# Patient Record
Sex: MALE | Race: WHITE | NOT HISPANIC OR LATINO
[De-identification: names, ages, dates, MRNs, and addresses within clinical notes are randomized per-mention and may not be internally consistent; named-entity substitution may affect disease eponyms.]

---

## 2021-12-11 ENCOUNTER — TRANSCRIPTION ENCOUNTER (OUTPATIENT)
Age: 56
End: 2021-12-11

## 2021-12-11 ENCOUNTER — EMERGENCY (EMERGENCY)
Facility: HOSPITAL | Age: 56
LOS: 1 days | Discharge: HOME | End: 2021-12-11
Attending: EMERGENCY MEDICINE
Payer: COMMERCIAL

## 2021-12-11 VITALS
RESPIRATION RATE: 18 BRPM | SYSTOLIC BLOOD PRESSURE: 167 MMHG | HEART RATE: 102 BPM | DIASTOLIC BLOOD PRESSURE: 109 MMHG | WEIGHT: 244.93 LBS | TEMPERATURE: 103 F | OXYGEN SATURATION: 95 %

## 2021-12-11 LAB — SARS-COV-2 RNA SPEC QL NAA+PROBE: DETECTED

## 2021-12-11 PROCEDURE — U0005: CPT

## 2021-12-11 PROCEDURE — 99284 EMERGENCY DEPT VISIT MOD MDM: CPT

## 2021-12-11 PROCEDURE — U0003: CPT

## 2021-12-11 PROCEDURE — 99283 EMERGENCY DEPT VISIT LOW MDM: CPT

## 2021-12-11 RX ORDER — ACETAMINOPHEN 500 MG
975 TABLET ORAL ONCE
Refills: 0 | Status: COMPLETED | OUTPATIENT
Start: 2021-12-11 | End: 2021-12-11

## 2021-12-11 RX ADMIN — Medication 975 MILLIGRAM(S): at 14:07

## 2021-12-11 NOTE — ED STATDOCS - CLINICAL SUMMARY MEDICAL DECISION MAKING FREE TEXT BOX
Patient presenting with COVID+ symptoms from [facility] after being told to come to [facility] [address] for the antibody infusion treatment. Will treat fever and complete online referral for antibody infusion. To malathi

## 2021-12-11 NOTE — ED STATDOCS - PATIENT PORTAL LINK FT
You can access the FollowMyHealth Patient Portal offered by [facility] by registering at the following website: [email or web address]. By joining [facility]’s FollowMyHealth portal, you will also be able to view your health information using other applications (apps) compatible with our system.

## 2021-12-11 NOTE — ED STATDOCS - ATTENDING CONTRIBUTION TO CARE
AJM: pt presenting with covid requesting MAB. no distress or hypoxia. will reer for MAB, send covid test. dc with return precautions and pulseox

## 2021-12-11 NOTE — ED STATDOCS - NSFOLLOWUPINSTRUCTIONS_ED_ALL_ED_FT
- Follow up with your doctor within 2-3 days.   - Take Tylenol (Acetaminophen) 650mg or Motrin (Ibuprofen/Advil) 600mg every 6 hours as needed for pain.   - Stay well hydrated.   - Stay at home until you receive test results.   - See attached COVID-19 instructions.     You were tested for COVID19 during your visit in the emergency department. The results will take 5-7hrs to come back. Do not return to work until your COVID test results as negative. Plan to self-quarantine yourself until this result is available. Avoid contact with others. Wash your hands frequently with soap and warm water for at least 20 seconds. If you develop worsening symptoms- such as respiratory distress, confusion, severe weakness, or anything new or concerning, please return to the emergency department to be evaluated.

## 2021-12-11 NOTE — ED STATDOCS - NS ED ROS FT
General: + fever, chills  MSK: Denies back pain, joint pain  Resp: Endorses cough, sob  CV: Denies CP, palpitations  GI: Denies nausea, vomiting  Neuro: Denies numbness, tingling  Skin: Denies rashes, lacerations

## 2021-12-11 NOTE — ED STATDOCS - PHYSICAL EXAMINATION
Gen: Well appearing in NAD  Head: NC/AT  Neck: Trachea midline  Resp: No distress, speaking in full sentences, CTAB  Ext: No deformities  Neuro: A&O appears non focal  Skin: Warm and dry as visualized  Psych: Normal affect and mood

## 2021-12-11 NOTE — ED STATDOCS - OBJECTIVE STATEMENT
Patient is a 55yo M presenting COVID+ from home. He states that he is unvaxed, wife tested + for covid wednesday. Patient started feeling unwell yesterday. Went to [facility] and was told to go to [facility] for the antibody infusion treatment. He endorses fevers, malaise, sob, cough.

## 2021-12-12 PROBLEM — Z78.9 OTHER SPECIFIED HEALTH STATUS: Chronic | Status: ACTIVE | Noted: 2021-12-11

## 2021-12-14 ENCOUNTER — APPOINTMENT (OUTPATIENT)
Dept: DISASTER EMERGENCY | Facility: HOSPITAL | Age: 56
End: 2021-12-14

## 2021-12-14 ENCOUNTER — OUTPATIENT (OUTPATIENT)
Dept: INPATIENT UNIT | Facility: HOSPITAL | Age: 56
LOS: 1 days | End: 2021-12-14
Payer: COMMERCIAL

## 2021-12-14 VITALS
HEART RATE: 87 BPM | OXYGEN SATURATION: 95 % | RESPIRATION RATE: 18 BRPM | WEIGHT: 244.93 LBS | SYSTOLIC BLOOD PRESSURE: 146 MMHG | TEMPERATURE: 101 F | HEIGHT: 72 IN | DIASTOLIC BLOOD PRESSURE: 87 MMHG

## 2021-12-14 VITALS
SYSTOLIC BLOOD PRESSURE: 108 MMHG | HEART RATE: 81 BPM | OXYGEN SATURATION: 93 % | RESPIRATION RATE: 19 BRPM | DIASTOLIC BLOOD PRESSURE: 74 MMHG | TEMPERATURE: 100 F

## 2021-12-14 DIAGNOSIS — U07.1 COVID-19: ICD-10-CM

## 2021-12-14 PROCEDURE — M0243: CPT

## 2021-12-14 RX ORDER — SODIUM CHLORIDE 9 MG/ML
250 INJECTION INTRAMUSCULAR; INTRAVENOUS; SUBCUTANEOUS
Refills: 0 | Status: COMPLETED | OUTPATIENT
Start: 2021-12-14 | End: 2021-12-14

## 2021-12-14 RX ORDER — ACETAMINOPHEN 500 MG
650 TABLET ORAL ONCE
Refills: 0 | Status: COMPLETED | OUTPATIENT
Start: 2021-12-14 | End: 2021-12-14

## 2021-12-14 RX ADMIN — Medication 650 MILLIGRAM(S): at 14:45

## 2021-12-14 RX ADMIN — SODIUM CHLORIDE 310 MILLILITER(S): 9 INJECTION INTRAMUSCULAR; INTRAVENOUS; SUBCUTANEOUS at 14:45

## 2021-12-14 RX ADMIN — Medication 650 MILLIGRAM(S): at 15:30

## 2021-12-14 NOTE — CHART NOTE - NSCHARTNOTEFT_GEN_A_CORE
CC: Monoclonal Antibody Infusion/COVID 19 Positive on 12/08/21      History: Patient presents for infusion of monoclonal antibody infusion. Patient has been screened and was deemed to be a candidate.    Symptoms/ Criteria: Fever, malaise, body aches, HA, loss of taste/ smell, GI Symptoms    Risk Profile includes:, BMI >35, HTN, not vaccinated    acetaminophen     Tablet .. 650 milliGRAM(s) Oral once  casirivimab/imdevimab in sodium chloride 0.9% (EUA) IVPB 100 milliLiter(s) IV Intermittent once  sodium chloride 0.9%. 250 milliLiter(s) IV Continuous <Continuous>      PMHx:  Infection due to severe acute respiratory syndrome coronavirus 2 (SARS-CoV-2)    No pertinent past medical history    No significant past surgical history          T(F): 101.0  HR: 100  BP: 146/87  RR: 20  SpO2:92%      PE:   Appearance: NAD	  HEENT:   Normal oral mucosa.   Lymphatic: No lymphadenopathy  Cardiovascular: Normal S1 S2, No JVD, No murmurs, No edema  Respiratory: Lungs clear to auscultation	  Gastrointestinal:  Soft, Non-tender. No guarding   Skin: warm and dry  Neurologic: Non-focal  Extremities: Normal range of motion.     ASSESSMENT:  Pt is a 56y year old Male Covid +  referred to the infusion center for Monoclonal antibody infusion (Regeneron).  COVID Vaccination Status: Not vaccinated      PLAN:  - infusion procedure explained to patient   -tylenol given for temp 101.0F  -Incentive spirometry for home utiization  - Consent for monoclonal antibody infusion obtained   - Risk & benefits discussed/all questions answered  - infuse Regeneron over 21 minutes  - will observe patient for one hour post infusion  and then if stable discharge home with outpt follow up as planned by PMD.    POST INFUSION ASSESSMENT:   DISCHARGE at approximately  1  hour post infusion    - Patient tolerated infusion well denies complaints of chest pain/SOB/dizziness/ palps  - VSS for discharge home  - D/C instructions given/ fact sheet included.  - Patient to follow-up with PCP as needed.

## 2021-12-15 ENCOUNTER — TRANSCRIPTION ENCOUNTER (OUTPATIENT)
Age: 56
End: 2021-12-15

## 2021-12-16 ENCOUNTER — TRANSCRIPTION ENCOUNTER (OUTPATIENT)
Age: 56
End: 2021-12-16

## 2023-01-18 ENCOUNTER — APPOINTMENT (OUTPATIENT)
Dept: ORTHOPEDIC SURGERY | Facility: CLINIC | Age: 58
End: 2023-01-18
Payer: OTHER MISCELLANEOUS

## 2023-01-18 VITALS — BODY MASS INDEX: 28.44 KG/M2 | HEIGHT: 72 IN | WEIGHT: 210 LBS

## 2023-01-18 DIAGNOSIS — I10 ESSENTIAL (PRIMARY) HYPERTENSION: ICD-10-CM

## 2023-01-18 PROCEDURE — 72040 X-RAY EXAM NECK SPINE 2-3 VW: CPT

## 2023-01-18 PROCEDURE — 99203 OFFICE O/P NEW LOW 30 MIN: CPT

## 2023-01-18 PROCEDURE — 73030 X-RAY EXAM OF SHOULDER: CPT | Mod: RT

## 2023-01-18 PROCEDURE — 99072 ADDL SUPL MATRL&STAF TM PHE: CPT

## 2023-01-18 RX ORDER — IBUPROFEN 600 MG/1
600 TABLET, FILM COATED ORAL TWICE DAILY
Qty: 60 | Refills: 2 | Status: ACTIVE | COMMUNITY
Start: 2023-01-18 | End: 1900-01-01

## 2023-01-18 NOTE — ASSESSMENT
[FreeTextEntry1] : acute onset of right shoulder pain after opening up a safe door work on 1/11/23.  felt a pop in the shoulder at that time.  posterior lateral pain.    concern for cuff tear.\par \par  at [facility]\par \par htn, migraines

## 2023-01-18 NOTE — DISCUSSION/SUMMARY
[de-identified] : Progress Note completed by Arianna Rodas PA-C\par * Dr. Stern -- The documentation recorded in this note accurately reflects the decisions made by me during this visit.

## 2023-01-18 NOTE — WORK
[Sprain/Strain] : sprain/strain [Was the competent medical cause of the injury] : was the competent medical cause of the injury [Are consistent with the injury] : are consistent with the injury [Consistent with my objective findings] : consistent with my objective findings [Mild Partial] : mild partial [Does not reveal pre-existing condition(s) that may affect treatment/prognosis] : does not reveal pre-existing condition(s) that may affect treatment/prognosis [Can return to work without limitations on ______] : can return to work without limitations on [unfilled] [Patient] : patient [No Rx restrictions] : No Rx restrictions. [I provided the services listed above] :  I provided the services listed above.

## 2023-01-18 NOTE — HISTORY OF PRESENT ILLNESS
[Work related] : work related [Sudden] : sudden [9] : 9 [1] : 2 [Dull/Aching] : dull/aching [Sharp] : sharp [Rest] : rest [Meds] : meds [Full time] : Work status: full time [de-identified] : WC DOI 1/11/23\par 1/18/23:  acute onset of right shoulder pain after opening up a safe door at work on 1/11/23.  felt a pop in the shoulder at that time.  [] : no [FreeTextEntry1] : Rt shoulder [FreeTextEntry3] : 1/11/23 [FreeTextEntry5] : Pt reports opening up a safe with a heavy door and in the process he felt a "pop" in the right shoulder. Since the injury  he reports pain to the shoulder and numbness that is radiating down the Right arm into the hand. He denies any prior shoulder hx or injuries.  [FreeTextEntry6] : numbness [FreeTextEntry7] : RT arm [FreeTextEntry9] : NSAIDS prn [de-identified] : lifting, certain movements, sleep [de-identified] : [facility]

## 2023-01-18 NOTE — PHYSICAL EXAM
[4 ___] : forward flexion 4[unfilled]/5 [4___] : abduction 4[unfilled]/5 [5___] : internal rotation 5[unfilled]/5 [Straightening consistent with spasm] : Straightening consistent with spasm [] : no erythema [Right] : right shoulder [There are no fractures, subluxations or dislocations. No significant abnormalities are seen] : There are no fractures, subluxations or dislocations. No significant abnormalities are seen [TWNoteComboBox7] : active forward flexion 150 degrees [de-identified] : active abduction 130 degrees [TWNoteComboBox6] : internal rotation sacrum [de-identified] : external rotation 70 degrees

## 2023-01-19 ENCOUNTER — FORM ENCOUNTER (OUTPATIENT)
Age: 58
End: 2023-01-19

## 2023-01-20 ENCOUNTER — APPOINTMENT (OUTPATIENT)
Dept: MRI IMAGING | Facility: CLINIC | Age: 58
End: 2023-01-20
Payer: OTHER MISCELLANEOUS

## 2023-01-20 PROCEDURE — 73221 MRI JOINT UPR EXTREM W/O DYE: CPT | Mod: RT

## 2023-01-20 PROCEDURE — 99072 ADDL SUPL MATRL&STAF TM PHE: CPT

## 2023-01-23 ENCOUNTER — TRANSCRIPTION ENCOUNTER (OUTPATIENT)
Age: 58
End: 2023-01-23

## 2023-02-01 ENCOUNTER — NON-APPOINTMENT (OUTPATIENT)
Age: 58
End: 2023-02-01

## 2023-02-01 ENCOUNTER — APPOINTMENT (OUTPATIENT)
Dept: ORTHOPEDIC SURGERY | Facility: CLINIC | Age: 58
End: 2023-02-01
Payer: OTHER MISCELLANEOUS

## 2023-02-01 VITALS — BODY MASS INDEX: 28.44 KG/M2 | WEIGHT: 210 LBS | HEIGHT: 72 IN

## 2023-02-01 PROCEDURE — 99214 OFFICE O/P EST MOD 30 MIN: CPT | Mod: 25

## 2023-02-01 PROCEDURE — J3490M: CUSTOM | Mod: RT

## 2023-02-01 PROCEDURE — 20611 DRAIN/INJ JOINT/BURSA W/US: CPT | Mod: RT

## 2023-02-01 PROCEDURE — 99072 ADDL SUPL MATRL&STAF TM PHE: CPT

## 2023-02-01 NOTE — DISCUSSION/SUMMARY
[de-identified] : Progress Note completed by Arianna Rodas PA-C\par * Dr. Stern -- The documentation recorded in this note accurately reflects the decisions made by me during this visit.

## 2023-02-01 NOTE — WORK
[Sprain/Strain] : sprain/strain [Was the competent medical cause of the injury] : was the competent medical cause of the injury [Are consistent with the injury] : are consistent with the injury [Consistent with my objective findings] : consistent with my objective findings [Does not reveal pre-existing condition(s) that may affect treatment/prognosis] : does not reveal pre-existing condition(s) that may affect treatment/prognosis [Patient] : patient [No Rx restrictions] : No Rx restrictions. [I provided the services listed above] :  I provided the services listed above. 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

## 2023-02-01 NOTE — PHYSICAL EXAM
[Straightening consistent with spasm] : Straightening consistent with spasm [4 ___] : forward flexion 4[unfilled]/5 [4___] : abduction 4[unfilled]/5 [5___] : internal rotation 5[unfilled]/5 [Right] : right shoulder [There are no fractures, subluxations or dislocations. No significant abnormalities are seen] : There are no fractures, subluxations or dislocations. No significant abnormalities are seen [] : no erythema [TWNoteComboBox7] : active forward flexion 155 degrees [de-identified] : active abduction 130 degrees [TWNoteComboBox6] : internal rotation sacrum [de-identified] : external rotation 50 degrees

## 2023-02-01 NOTE — HISTORY OF PRESENT ILLNESS
[Work related] : work related [Gradual] : gradual [Dull/Aching] : dull/aching [Constant] : constant [Sudden] : sudden [9] : 9 [1] : 2 [Sharp] : sharp [Rest] : rest [Meds] : meds [Full time] : Work status: full time [de-identified] : WC DOI 1/11/23\par 1/18/23:  acute onset of right shoulder pain after opening up a safe door at work on 1/11/23.  felt a pop in the shoulder at that time.\par 2/1/23:  right shoulder pain persists.  [] : no [FreeTextEntry1] : Rt shoulder [FreeTextEntry3] : 1/11/23 [FreeTextEntry5] : Pt reports opening up a safe with a heavy door and in the process he felt a "pop" in the right shoulder. Since the injury  he reports pain to the shoulder and numbness that is radiating down the Right arm into the hand. He denies any prior shoulder hx or injuries.  [FreeTextEntry6] : numbness [FreeTextEntry7] : RT arm [FreeTextEntry9] : NSAIDS prn [de-identified] : lifting, certain movements, sleep [de-identified] : [facility]

## 2023-02-01 NOTE — PROCEDURE
[Large Joint Injection] : Large joint injection [Right] : of the right [Shoulder] : shoulder [Pain] : pain [Alcohol] : alcohol [Betadine] : betadine [___ cc    3mg] :  Betamethasone (Celestone) ~Vcc of 3mg [___ cc    1%] : Lidocaine ~Vcc of 1%  [___ cc    0.25%] : Bupivacaine (Marcaine) ~Vcc of 0.25%  [] : Patient tolerated procedure well [Call if redness, pain or fever occur] : call if redness, pain or fever occur [Apply ice for 15min out of every hour for the next 12-24 hours as tolerated] : apply ice for 15 minutes out of every hour for the next 12-24 hours as tolerated [Patient was advised to rest the joint(s) for ____ days] : patient was advised to rest the joint(s) for [unfilled] days [Previous OTC use and PT nontherapeutic] : patient has tried OTC's including aspirin, Ibuprofen, Aleve, etc or prescription NSAIDS, and/or exercises at home and/or physical therapy without satisfactory response [Patient had decreased mobility in the joint] : patient had decreased mobility in the joint [Risks, benefits, alternatives discussed / Verbal consent obtained] : the risks benefits, and alternatives have been discussed, and verbal consent was obtained [All ultrasound images have been permanently captured and stored accordingly in our picture archiving and communication system] : All ultrasound images have been permanently captured and stored accordingly in our picture archiving and communication system [Visualization of the needle and placement of injection was performed without complication] : visualization of the needle and placement of injection was performed without complication [Inflammation] : inflammation [de-identified] : for accurate placement of needle into sa space and joint

## 2023-02-01 NOTE — ASSESSMENT
[FreeTextEntry1] : acute onset of right shoulder pain after opening up a safe door work on 1/11/23.  felt a pop in the shoulder at that time. mri 2023 shows diffuse labral tearing, ac joint oa,  impingement, acromial spur.\par \par  at [facility]\par \par htn, migraines

## 2023-03-01 ENCOUNTER — APPOINTMENT (OUTPATIENT)
Dept: ORTHOPEDIC SURGERY | Facility: CLINIC | Age: 58
End: 2023-03-01
Payer: OTHER MISCELLANEOUS

## 2023-03-01 VITALS — HEIGHT: 72 IN | BODY MASS INDEX: 28.44 KG/M2 | WEIGHT: 210 LBS

## 2023-03-01 PROCEDURE — 99072 ADDL SUPL MATRL&STAF TM PHE: CPT

## 2023-03-01 PROCEDURE — 99214 OFFICE O/P EST MOD 30 MIN: CPT

## 2023-03-01 NOTE — WORK
[Sprain/Strain] : sprain/strain [Was the competent medical cause of the injury] : was the competent medical cause of the injury [Are consistent with the injury] : are consistent with the injury [Consistent with my objective findings] : consistent with my objective findings [Total] : total [Does not reveal pre-existing condition(s) that may affect treatment/prognosis] : does not reveal pre-existing condition(s) that may affect treatment/prognosis [Cannot return to work because ________] : cannot return to work because [unfilled] [Climbing stairs/Ladders] : climbing stairs/ladders [Lifting] : lifting [Pulling/Pushing] : pulling/pushing [Simple grasping] : simple grasping [Reaching overhead] : reaching overhead [Reaching at/below shoulder level] : reaching at or below shoulder level [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 [I provided the services listed above] :  I provided the services listed above.

## 2023-03-01 NOTE — HISTORY OF PRESENT ILLNESS
[Work related] : work related [Gradual] : gradual [Sudden] : sudden [Dull/Aching] : dull/aching [Sharp] : sharp [Constant] : constant [Rest] : rest [Meds] : meds [Full time] : Work status: full time [8] : 8 [4] : 4 [de-identified] : WC DOI 1/11/23\par 1/18/23:  acute onset of right shoulder pain after opening up a safe door at work on 1/11/23.  felt a pop in the shoulder at that time.\par 2/1/23:  right shoulder pain persists. \par 3/1/23: shoulder pain persists.  neck tightness also present and some tingling down the arm. [] : no [FreeTextEntry1] : Rt shoulder [FreeTextEntry3] : 1/11/23 [FreeTextEntry5] : Pt reports opening up a safe with a heavy door and in the process he felt a "pop" in the right shoulder. Since the injury  he reports pain to the shoulder and numbness that is radiating down the Right arm into the hand. He denies any prior shoulder hx or injuries.  [FreeTextEntry6] : numbness [FreeTextEntry7] : RT arm [FreeTextEntry9] : NSAIDS prn [de-identified] : lifting, certain movements, sleep [de-identified] : [facility]

## 2023-03-01 NOTE — ASSESSMENT
[FreeTextEntry1] : acute onset of right shoulder pain after opening up a safe door work on 1/11/23.  felt a pop in the shoulder at that time. mri 2023 shows diffuse labral tearing, ac joint oa,  impingement, acromial spur.  minimal improvement with injection and Pt.  still pain in shoulder.\par \par also tingling in hand.  some neck issues in past but never tinling in hand.  possible hnp.  see spine docs in [place] for a while ([facility])\par \par  at [facility]\par \par htn, migraines

## 2023-03-05 ENCOUNTER — FORM ENCOUNTER (OUTPATIENT)
Age: 58
End: 2023-03-05

## 2023-03-29 ENCOUNTER — APPOINTMENT (OUTPATIENT)
Dept: ORTHOPEDIC SURGERY | Facility: CLINIC | Age: 58
End: 2023-03-29
Payer: OTHER MISCELLANEOUS

## 2023-03-29 VITALS — HEIGHT: 72 IN | WEIGHT: 210 LBS | BODY MASS INDEX: 28.44 KG/M2

## 2023-03-29 PROCEDURE — 99214 OFFICE O/P EST MOD 30 MIN: CPT

## 2023-03-29 NOTE — WORK
[Sprain/Strain] : sprain/strain [Was the competent medical cause of the injury] : was the competent medical cause of the injury [Are consistent with the injury] : are consistent with the injury [Consistent with my objective findings] : consistent with my objective findings [Does not reveal pre-existing condition(s) that may affect treatment/prognosis] : does not reveal pre-existing condition(s) that may affect treatment/prognosis [Cannot return to work because ________] : cannot return to work because [unfilled] [Climbing stairs/Ladders] : climbing stairs/ladders [Lifting] : lifting [Pulling/Pushing] : pulling/pushing [Simple grasping] : simple grasping [Reaching overhead] : reaching overhead [Reaching at/below shoulder level] : reaching at or below shoulder level [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 [I provided the services listed above] :  I provided the services listed above. [Total (100%)] : total (100%)

## 2023-03-29 NOTE — ASSESSMENT
[FreeTextEntry1] : acute onset of right shoulder pain after opening up a safe door work on 1/11/23.  felt a pop in the shoulder at that time. mri 2023 shows diffuse labral tearing, ac joint oa,  impingement, acromial spur.  minimal improvement with injection and Pt.  patient states surgery being denied.  i reviewed the history again with the patient. the patient has no history of shoulder problems and the mechanism described is the cause of the injury to labrum and other structures demonstrated on the mri of the right shoulder. the ac joint arthritis present was likely exacerbated by the injury but was not symptomatic prior to injury.\par \par also tingling in hand.  some neck issues in past but never tinling in hand.  possible hnp.  see spine docs in [place] for a while ([facility]).  mild improvement.  continuing treatment.\par \par  at [facility]\par \par htn, migraines

## 2023-03-29 NOTE — PHYSICAL EXAM
[Right] : right shoulder [4 ___] : forward flexion 4[unfilled]/5 [4___] : abduction 4[unfilled]/5 [5___] : internal rotation 5[unfilled]/5 [] : no scars [de-identified] : some tingling in fingertips. [FreeTextEntry3] : minimal swelling [TWNoteComboBox7] : active forward flexion 160 degrees [de-identified] : active abduction 135 degrees [TWNoteComboBox6] : internal rotation sacrum [de-identified] : external rotation 50 degrees

## 2023-04-24 ENCOUNTER — NON-APPOINTMENT (OUTPATIENT)
Age: 58
End: 2023-04-24

## 2023-04-27 ENCOUNTER — FORM ENCOUNTER (OUTPATIENT)
Age: 58
End: 2023-04-27

## 2023-06-07 ENCOUNTER — APPOINTMENT (OUTPATIENT)
Dept: ORTHOPEDIC SURGERY | Facility: CLINIC | Age: 58
End: 2023-06-07
Payer: OTHER MISCELLANEOUS

## 2023-06-07 VITALS — WEIGHT: 210 LBS | BODY MASS INDEX: 28.44 KG/M2 | HEIGHT: 72 IN

## 2023-06-07 DIAGNOSIS — M54.2 CERVICALGIA: ICD-10-CM

## 2023-06-07 PROCEDURE — 99214 OFFICE O/P EST MOD 30 MIN: CPT

## 2023-06-07 NOTE — PHYSICAL EXAM
[Right] : right shoulder [4 ___] : forward flexion 4[unfilled]/5 [4___] : abduction 4[unfilled]/5 [5___] : internal rotation 5[unfilled]/5 [de-identified] : some tingling in fingertips. [] : swelling [FreeTextEntry3] : minimal swelling [TWNoteComboBox7] : active forward flexion 140 degrees [de-identified] : active abduction 140 degrees [TWNoteComboBox6] : internal rotation sacrum [de-identified] : external rotation 35 degrees

## 2023-06-07 NOTE — WORK
[Sprain/Strain] : sprain/strain [Was the competent medical cause of the injury] : was the competent medical cause of the injury [Are consistent with the injury] : are consistent with the injury [Consistent with my objective findings] : consistent with my objective findings [Total (100%)] : total (100%) [Does not reveal pre-existing condition(s) that may affect treatment/prognosis] : does not reveal pre-existing condition(s) that may affect treatment/prognosis [Cannot return to work because ________] : cannot return to work because [unfilled] [Climbing stairs/Ladders] : climbing stairs/ladders [Lifting] : lifting [Pulling/Pushing] : pulling/pushing [Simple grasping] : simple grasping [Reaching overhead] : reaching overhead [Reaching at/below shoulder level] : reaching at or below shoulder level [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 [I provided the services listed above] :  I provided the services listed above.

## 2023-06-07 NOTE — ASSESSMENT
[FreeTextEntry1] : acute onset of right shoulder pain after opening up a safe door work on 1/11/23.  felt a pop in the shoulder at that time. mri 2023 shows diffuse labral tearing, ac joint oa,  impingement, acromial spur.  minimal improvement with injection and Pt.  patient states surgery being denied but has seen juanita may 2023.  i reviewed the history again with the patient. the patient has no history of shoulder problems and the mechanism described is the cause of the injury to labrum and other structures demonstrated on the mri of the right shoulder. the ac joint arthritis present was likely exacerbated by the injury but was not symptomatic prior to injury.\par \par also tingling in hand.  some neck issues in past but never tinling in hand.  possible hnp.  see spine docs in [place] for a while ([facility]).  mild improvement.  continuing treatment.\par \par  at [facility]\par \par htn, migraines

## 2023-06-07 NOTE — HISTORY OF PRESENT ILLNESS
[8] : 8 [3] : 3 [] : yes [de-identified] : WC DOI 1/11/23\par 1/18/23:  acute onset of right shoulder pain after opening up a safe door at work on 1/11/23.  felt a pop in the shoulder at that time.\par 2/1/23:  right shoulder pain persists. \par 3/1/23: shoulder pain persists.  neck tightness also present and some tingling down the arm.\par 3/29/23:  right shoulder pain persists.  surgery has been denied at this point.  tingling with mild improvement.\par 6/7/23:  pain persists. waiting for authorization. [FreeTextEntry1] : right shoulder

## 2023-07-03 ENCOUNTER — APPOINTMENT (OUTPATIENT)
Dept: ORTHOPEDIC SURGERY | Facility: CLINIC | Age: 58
End: 2023-07-03

## 2023-07-05 ENCOUNTER — APPOINTMENT (OUTPATIENT)
Dept: ORTHOPEDIC SURGERY | Facility: CLINIC | Age: 58
End: 2023-07-05
Payer: OTHER MISCELLANEOUS

## 2023-07-05 PROCEDURE — L3670: CPT | Mod: RT

## 2023-07-10 ENCOUNTER — APPOINTMENT (OUTPATIENT)
Age: 58
End: 2023-07-10
Payer: OTHER MISCELLANEOUS

## 2023-07-10 PROCEDURE — 29823 SHO ARTHRS SRG XTNSV DBRDMT: CPT | Mod: RT

## 2023-07-10 PROCEDURE — 29826 SHO ARTHRS SRG DECOMPRESSION: CPT | Mod: AS,RT

## 2023-07-10 PROCEDURE — 29824 SHO ARTHRS SRG DSTL CLAVICLC: CPT | Mod: AS,59,RT

## 2023-07-10 PROCEDURE — 29821 SHO ARTHRS SRG COMPL SYNVCT: CPT | Mod: 59,RT

## 2023-07-10 PROCEDURE — 29825 SHO ARTHRS SRG LSS&RESCJ ADS: CPT | Mod: AS,59,RT

## 2023-07-10 PROCEDURE — 23405 INCISION OF TENDON & MUSCLE: CPT | Mod: AS,59,RT

## 2023-07-10 PROCEDURE — 29825 SHO ARTHRS SRG LSS&RESCJ ADS: CPT | Mod: 59,RT

## 2023-07-10 PROCEDURE — 29824 SHO ARTHRS SRG DSTL CLAVICLC: CPT | Mod: 59,RT

## 2023-07-10 PROCEDURE — 29821 SHO ARTHRS SRG COMPL SYNVCT: CPT | Mod: AS,59,RT

## 2023-07-10 PROCEDURE — 23405 INCISION OF TENDON & MUSCLE: CPT | Mod: 59,RT

## 2023-07-10 PROCEDURE — 29826 SHO ARTHRS SRG DECOMPRESSION: CPT | Mod: RT

## 2023-07-10 PROCEDURE — 29823 SHO ARTHRS SRG XTNSV DBRDMT: CPT | Mod: AS,RT

## 2023-07-10 RX ORDER — ASPIRIN/ACETAMINOPHEN/CAFFEINE 500-325-65
325 POWDER IN PACKET (EA) ORAL
Qty: 30 | Refills: 0 | Status: ACTIVE | COMMUNITY
Start: 2023-07-10 | End: 1900-01-01

## 2023-07-10 RX ORDER — HYDROCODONE BITARTRATE AND ACETAMINOPHEN 5; 325 MG/1; MG/1
5-325 TABLET ORAL
Qty: 42 | Refills: 0 | Status: ACTIVE | COMMUNITY
Start: 2023-07-10 | End: 1900-01-01

## 2023-07-19 ENCOUNTER — APPOINTMENT (OUTPATIENT)
Dept: ORTHOPEDIC SURGERY | Facility: CLINIC | Age: 58
End: 2023-07-19
Payer: OTHER MISCELLANEOUS

## 2023-07-19 DIAGNOSIS — S46.911A STRAIN OF UNSPECIFIED MUSCLE, FASCIA AND TENDON AT SHOULDER AND UPPER ARM LEVEL, RIGHT ARM, INITIAL ENCOUNTER: ICD-10-CM

## 2023-07-19 PROCEDURE — 73080 X-RAY EXAM OF ELBOW: CPT | Mod: RT

## 2023-07-19 PROCEDURE — 99024 POSTOP FOLLOW-UP VISIT: CPT

## 2023-07-19 RX ORDER — SULFAMETHOXAZOLE AND TRIMETHOPRIM 800; 160 MG/1; MG/1
800-160 TABLET ORAL
Qty: 14 | Refills: 0 | Status: ACTIVE | COMMUNITY
Start: 2023-07-19 | End: 1900-01-01

## 2023-07-19 RX ORDER — OXYCODONE AND ACETAMINOPHEN 5; 325 MG/1; MG/1
5-325 TABLET ORAL TWICE DAILY
Qty: 14 | Refills: 0 | Status: ACTIVE | COMMUNITY
Start: 2023-07-19 | End: 1900-01-01

## 2023-07-19 RX ORDER — DICLOFENAC SODIUM 75 MG/1
75 TABLET, DELAYED RELEASE ORAL
Qty: 60 | Refills: 2 | Status: ACTIVE | COMMUNITY
Start: 2023-07-19 | End: 1900-01-01

## 2023-07-19 NOTE — ASSESSMENT
[FreeTextEntry1] : s/p right shoulder eua, tank and scope for capsular release, proximal biceps tenotomy, labral gladis, sad, bursectomy and ac joint resection on 7/10/23. \par \par right elbow pain and swelling on 7/17/23.   no injury.  no fevers or chills.  likely aseptic olecranon bursitis from use of sling.   \par \par also tingling in hand.  some neck issues in past but never tinling in hand.  possible hnp.  see spine docs in [place] for a while ([facility]).  mild improvement.  continuing treatment.\par \par  at [facility]\par \par htn, migraines

## 2023-07-19 NOTE — DISCUSSION/SUMMARY
[de-identified] : Progress Note completed by Arianna Rodas PA-C\par * Dr. Stern -- The documentation recorded in this note accurately reflects the decisions made by me during this visit.

## 2023-07-19 NOTE — PHYSICAL EXAM
[Right] : right shoulder [Left] : left elbow [] : no sero-sanguinous drainage [FreeTextEntry3] : no signs of infection noted.  [TWNoteComboBox7] : flexion 130 degrees [TWNoteComboBox4] : extension <0 degrees [TWNoteComboBox6] : pronation 80 degrees [de-identified] : supination 60 degrees

## 2023-07-19 NOTE — HISTORY OF PRESENT ILLNESS
[Work related] : work related [8] : 8 [Dull/Aching] : dull/aching [Sharp] : sharp [Shooting] : shooting [Constant] : constant [Meds] : meds [] : Post Surgical Visit: yes [de-identified] : WC DOI 1/11/23\par 7/19/23:  s/p right shoulder scope since 7/10/23.  now right elbow swelling since 7/17/23.  no injury.  no fevers or chills.  [FreeTextEntry1] : rt shoulder [FreeTextEntry3] : 7/10/23 [FreeTextEntry5] : has pain in his elbow, it is swollen and fluid is inside  [de-identified] : movements [de-identified] : 07/10 [de-identified] : shoulder

## 2023-08-02 ENCOUNTER — APPOINTMENT (OUTPATIENT)
Dept: ORTHOPEDIC SURGERY | Facility: CLINIC | Age: 58
End: 2023-08-02
Payer: OTHER MISCELLANEOUS

## 2023-08-02 VITALS — BODY MASS INDEX: 28.44 KG/M2 | HEIGHT: 72 IN | WEIGHT: 210 LBS

## 2023-08-02 DIAGNOSIS — M70.21 OLECRANON BURSITIS, RIGHT ELBOW: ICD-10-CM

## 2023-08-02 PROCEDURE — 99204 OFFICE O/P NEW MOD 45 MIN: CPT

## 2023-08-02 NOTE — HISTORY OF PRESENT ILLNESS
[3] : 3 [Dull/Aching] : dull/aching [Constant] : constant [Meds] : meds [] : Post Surgical Visit: yes [de-identified] : WC DOI 1/11/23 7/19/23:  s/p right shoulder scope since 7/10/23.  now right elbow swelling since 7/17/23.  no injury.  no fevers or chills.  8/2/23: right elbow and right shoulder improved. [FreeTextEntry1] : rt shoulder [de-identified] : lifting [de-identified] : pt [de-identified] : 07/10 [de-identified] : shoulder surgery

## 2023-08-02 NOTE — PHYSICAL EXAM
[3 ___] : forward flexion 3[unfilled]/5 [3___] : abduction 3[unfilled]/5 [4___] : internal rotation 4[unfilled]/5 [Right] : right elbow [] : no small olecranon bursal effusion [5___] : supination 5[unfilled]/5 [FreeTextEntry3] : no signs of infection noted.  [TWNoteComboBox7] : flexion 130 degrees [TWNoteComboBox4] : extension <0 degrees [TWNoteComboBox6] : pronation 80 degrees [de-identified] : supination 90 degrees

## 2023-08-02 NOTE — ASSESSMENT
[FreeTextEntry1] : s/p right shoulder eua, tank and scope for capsular release, proximal biceps tenotomy, labral gladis, sad, bursectomy and ac joint resection on 7/10/23.  improved.  right elbow pain and swelling on 7/17/23.   no injury.  no fevers or chills.  likely aseptic olecranon bursitis from use of sling.   moslty resolved.  also tingling in hand.  some neck issues in past but never tingling in hand.  possible hnp.  see spine docs in [place] for a while ([facility]).  improved.   at [facility]  htn, migraines

## 2023-08-30 ENCOUNTER — APPOINTMENT (OUTPATIENT)
Dept: ORTHOPEDIC SURGERY | Facility: CLINIC | Age: 58
End: 2023-08-30
Payer: OTHER MISCELLANEOUS

## 2023-08-30 VITALS — HEIGHT: 72 IN | WEIGHT: 220 LBS | BODY MASS INDEX: 29.8 KG/M2

## 2023-08-30 PROCEDURE — 99024 POSTOP FOLLOW-UP VISIT: CPT

## 2023-08-30 NOTE — HISTORY OF PRESENT ILLNESS
[Dull/Aching] : dull/aching [Localized] : localized [Sharp] : sharp [Physical therapy] : physical therapy [de-identified] : WC DOI 1/11/23 7/19/23:  s/p right shoulder scope since 7/10/23.  now right elbow swelling since 7/17/23.  no injury.  no fevers or chills.  8/2/23: right elbow and right shoulder improved. 8/30/23: improved but still some pain and stiffness. [3] : 3 [Constant] : constant [Meds] : meds [] : no [FreeTextEntry1] : rt shoulder [FreeTextEntry5] : Pt is here for PO #3 R shoulder. States he has been improving since last visit. Has been going to PT 2-3x a week and states it has been going well.  [de-identified] : lifting [de-identified] : pt [de-identified] : 07/10 [de-identified] : shoulder surgery

## 2023-08-30 NOTE — ASSESSMENT
[FreeTextEntry1] : s/p right shoulder eua, tank and scope for capsular release, proximal biceps tenotomy, labral gladis, sad, bursectomy and ac joint resection on 7/10/23.  improved but still pain and stiffness. improving with PT.  right elbow pain and swelling on 7/17/23.   no injury.  no fevers or chills.  likely aseptic olecranon bursitis from use of sling.    resolved.  also tingling in hand.  some neck issues in past but never tingling in hand.  possible hnp.  see spine docs in [place] for a while ([facility]).  improved.   at [facility]  htn, migraines

## 2023-08-30 NOTE — PHYSICAL EXAM
[3 ___] : forward flexion 3[unfilled]/5 [3___] : abduction 3[unfilled]/5 [4___] : internal rotation 4[unfilled]/5 [Right] : right elbow [5___] : supination 5[unfilled]/5 [] : light touch intact [FreeTextEntry3] : no signs of infection noted.  [TWNoteComboBox7] : flexion 130 degrees [TWNoteComboBox4] : extension <0 degrees [TWNoteComboBox6] : pronation 80 degrees [de-identified] : supination 90 degrees

## 2023-10-04 ENCOUNTER — APPOINTMENT (OUTPATIENT)
Dept: ORTHOPEDIC SURGERY | Facility: CLINIC | Age: 58
End: 2023-10-04
Payer: OTHER MISCELLANEOUS

## 2023-10-04 VITALS — BODY MASS INDEX: 29.8 KG/M2 | HEIGHT: 72 IN | WEIGHT: 220 LBS

## 2023-10-04 PROCEDURE — 99024 POSTOP FOLLOW-UP VISIT: CPT

## 2023-11-15 ENCOUNTER — APPOINTMENT (OUTPATIENT)
Dept: ORTHOPEDIC SURGERY | Facility: CLINIC | Age: 58
End: 2023-11-15
Payer: OTHER MISCELLANEOUS

## 2023-11-15 VITALS — BODY MASS INDEX: 29.8 KG/M2 | WEIGHT: 220 LBS | HEIGHT: 72 IN

## 2023-11-15 PROCEDURE — 99213 OFFICE O/P EST LOW 20 MIN: CPT

## 2023-12-13 ENCOUNTER — APPOINTMENT (OUTPATIENT)
Dept: ORTHOPEDIC SURGERY | Facility: CLINIC | Age: 58
End: 2023-12-13
Payer: OTHER MISCELLANEOUS

## 2023-12-13 VITALS — WEIGHT: 220 LBS | HEIGHT: 72 IN | BODY MASS INDEX: 29.8 KG/M2

## 2023-12-13 PROCEDURE — 99213 OFFICE O/P EST LOW 20 MIN: CPT

## 2023-12-13 NOTE — ASSESSMENT
[FreeTextEntry1] : s/p right shoulder eua, tank and scope for capsular release, proximal biceps tenotomy, labral gladis, sad, bursectomy and ac joint resection on 7/10/23. improving with pt. still tight and stiff.   right elbow pain and swelling on 7/17/23. no injury. no fevers or chills. likely aseptic olecranon bursitis from use of sling. resolved.  also tingling in hand. some neck issues in past but never tingling in hand. possible hnp. see spine docs in [place] for a while ([facility]). improved.   at [facility] - no light duty available at work.   htn, migraines.

## 2023-12-13 NOTE — DISCUSSION/SUMMARY
[de-identified] : continue pt hep rtw on 12/14/23. follow up in 6 weeks  Progress Note completed by Arianna Rodas PA-C * Dr. Stern -- The documentation recorded in this note accurately reflects the decisions made by me during this visit.

## 2023-12-13 NOTE — HISTORY OF PRESENT ILLNESS
[Work related] : work related [8] : 8 [2] : 2 [Sharp] : sharp [] : yes [de-identified] : WC DOI 1/11/23 7/19/23:  s/p right shoulder scope since 7/10/23.  now right elbow swelling since 7/17/23.  no injury.  no fevers or chills.  8/2/23: right elbow and right shoulder improved. 8/30/23: improved but still some pain and stiffness. 10/4/23:  improving but still tightness.  pain much less.  11/15/23:  follow up right shoulder.  improving with but mild discomfort with reaching and still some tightness.  12/13/23:  follow up right shoulder.  still some tightness but better.  [FreeTextEntry1] : R shoulder  [FreeTextEntry3] : 1/11/23  [de-identified] : PT

## 2023-12-13 NOTE — WORK
[Sprain/Strain] : sprain/strain [Was the competent medical cause of the injury] : was the competent medical cause of the injury [Are consistent with the injury] : are consistent with the injury [Consistent with my objective findings] : consistent with my objective findings [Total (100%)] : total (100%) [Does not reveal pre-existing condition(s) that may affect treatment/prognosis] : does not reveal pre-existing condition(s) that may affect treatment/prognosis [Can return to work without limitations on ______] : can return to work without limitations on [unfilled] [Unknown at this time] : : unknown at this time [Patient] : patient [No Rx restrictions] : No Rx restrictions. [I provided the services listed above] :  I provided the services listed above.

## 2023-12-13 NOTE — PHYSICAL EXAM
[5 ___] : forward flexion 5[unfilled]/5 [4___] : internal rotation 4[unfilled]/5 [de-identified] : active abduction 130 degrees [Right] : right elbow [5___] : supination 5[unfilled]/5 [] : light touch intact [FreeTextEntry3] : no signs of infection noted.  [TWNoteComboBox7] : flexion 130 degrees [TWNoteComboBox4] : extension <0 degrees [TWNoteComboBox6] : pronation 80 degrees [de-identified] : supination 90 degrees

## 2024-01-17 ENCOUNTER — APPOINTMENT (OUTPATIENT)
Dept: ORTHOPEDIC SURGERY | Facility: CLINIC | Age: 59
End: 2024-01-17
Payer: OTHER MISCELLANEOUS

## 2024-01-17 VITALS — HEIGHT: 72 IN | WEIGHT: 220 LBS | BODY MASS INDEX: 29.8 KG/M2

## 2024-01-17 PROCEDURE — 99213 OFFICE O/P EST LOW 20 MIN: CPT

## 2024-01-17 NOTE — DISCUSSION/SUMMARY
[de-identified] : transitioning out of pt.  hep. follow up in 6 weeks.   Progress Note completed by Arianna Rodas PA-C * Dr. Stern -- The documentation recorded in this note accurately reflects the decisions made by me during this visit.

## 2024-01-17 NOTE — HISTORY OF PRESENT ILLNESS
[Work related] : work related [8] : 8 [2] : 2 [de-identified] : WC DOI 1/11/23 7/19/23:  s/p right shoulder scope since 7/10/23.  now right elbow swelling since 7/17/23.  no injury.  no fevers or chills.  8/2/23: right elbow and right shoulder improved. 8/30/23: improved but still some pain and stiffness. 10/4/23:  improving but still tightness.  pain much less.  11/15/23:  follow up right shoulder.  improving with but mild discomfort with reaching and still some tightness.  12/13/23:  follow up right shoulder.  still some tightness but better.  1/17/24:  follow up right shoulder.  improving.  [FreeTextEntry1] : R shoulder  [de-identified] : pt, hep

## 2024-01-17 NOTE — ASSESSMENT
[FreeTextEntry1] : s/p right shoulder eua, tank and scope for capsular release, proximal biceps tenotomy, labral gladis, sad, bursectomy and ac joint resection on 7/10/23.   improving but some tightness at times.   right elbow pain and swelling on 7/17/23. no injury. no fevers or chills. likely aseptic olecranon bursitis from use of sling. resolved.  also tingling in hand. some neck issues in past but never tingling in hand. possible hnp. see spine docs in [place] for a while ([facility]). improved.   at [facility] - no light duty available at work.   htn, migraines.

## 2024-01-17 NOTE — PHYSICAL EXAM
[5 ___] : forward flexion 5[unfilled]/5 [de-identified] : active abduction 145 degrees [Right] : right elbow [5___] : supination 5[unfilled]/5 [] : light touch intact [FreeTextEntry3] : no signs of infection noted.  [TWNoteComboBox7] : flexion 130 degrees [TWNoteComboBox4] : extension <0 degrees [TWNoteComboBox6] : pronation 80 degrees [de-identified] : supination 90 degrees

## 2024-03-20 ENCOUNTER — APPOINTMENT (OUTPATIENT)
Dept: ORTHOPEDIC SURGERY | Facility: CLINIC | Age: 59
End: 2024-03-20
Payer: OTHER MISCELLANEOUS

## 2024-03-20 VITALS — BODY MASS INDEX: 29.8 KG/M2 | HEIGHT: 72 IN | WEIGHT: 220 LBS

## 2024-03-20 DIAGNOSIS — S43.431D SUPERIOR GLENOID LABRUM LESION OF RIGHT SHOULDER, SUBSEQUENT ENCOUNTER: ICD-10-CM

## 2024-03-20 PROCEDURE — 99213 OFFICE O/P EST LOW 20 MIN: CPT

## 2024-03-20 NOTE — DISCUSSION/SUMMARY
[de-identified] : hep. follow up in 6 weeks.   Progress Note completed by Albina Nguyen PA-C * Dr. Stern -- The documentation recorded in this note accurately reflects the decisions made by me during this visit.

## 2024-03-20 NOTE — WORK
[Was the competent medical cause of the injury] : was the competent medical cause of the injury [Sprain/Strain] : sprain/strain [Are consistent with the injury] : are consistent with the injury [Consistent with my objective findings] : consistent with my objective findings [Total (100%)] : total (100%) [Can return to work without limitations on ______] : can return to work without limitations on [unfilled] [Does not reveal pre-existing condition(s) that may affect treatment/prognosis] : does not reveal pre-existing condition(s) that may affect treatment/prognosis [Patient] : patient [Unknown at this time] : : unknown at this time [I provided the services listed above] :  I provided the services listed above. [No Rx restrictions] : No Rx restrictions.

## 2024-03-20 NOTE — PHYSICAL EXAM
[5 ___] : forward flexion 5[unfilled]/5 [Right] : right elbow [5___] : pronation 5[unfilled]/5 [de-identified] : active abduction 145 degrees [] : no swelling [FreeTextEntry3] : no signs of infection noted.  [TWNoteComboBox7] : flexion 130 degrees [TWNoteComboBox4] : extension <0 degrees [TWNoteComboBox6] : pronation 80 degrees [de-identified] : supination 90 degrees

## 2024-03-20 NOTE — ASSESSMENT
[FreeTextEntry1] : s/p right shoulder eua, tank and scope for capsular release, proximal biceps tenotomy, labral gladis, sad, bursectomy and ac joint resection on 7/10/23.   improving but some tightness at times.   right elbow pain and swelling on 7/17/23. no injury. no fevers or chills. likely aseptic olecranon bursitis from use of sling. resolved.  also tingling in hand. some neck issues in past but never tingling in hand. possible hnp. see spine docs in [place] for a while ([facility]). improved.   at [facility] - no light duty available at work.  working full time, full duty  htn, migraines.

## 2024-03-20 NOTE — HISTORY OF PRESENT ILLNESS
[Work related] : work related [4] : 4 [0] : 0 [Shooting] : shooting [Sharp] : sharp [Stabbing] : stabbing [Sleep] : sleep [Intermittent] : intermittent [Rest] : rest [Full time] : Work status: full time [de-identified] : WC DOI 1/11/23 7/19/23:  s/p right shoulder scope since 7/10/23.  now right elbow swelling since 7/17/23.  no injury.  no fevers or chills.  8/2/23: right elbow and right shoulder improved. 8/30/23: improved but still some pain and stiffness. 10/4/23:  improving but still tightness.  pain much less.  11/15/23:  follow up right shoulder.  improving with but mild discomfort with reaching and still some tightness.  12/13/23:  follow up right shoulder.  still some tightness but better.  1/17/24:  follow up right shoulder.  improving.  3/20/24: f/up R shoulder. has been improving. No longer in PT, doing HEP. Working full time, full duty [FreeTextEntry1] : Rt. Shoulder [] : no [FreeTextEntry3] : 01/11/2023 [de-identified] : 06/06/2023 Leena Comer [de-identified] : sudden movements [de-identified] : [facility]

## 2024-05-15 ENCOUNTER — APPOINTMENT (OUTPATIENT)
Dept: ORTHOPEDIC SURGERY | Facility: CLINIC | Age: 59
End: 2024-05-15
Payer: OTHER MISCELLANEOUS

## 2024-05-15 VITALS — WEIGHT: 220 LBS | BODY MASS INDEX: 29.8 KG/M2 | HEIGHT: 72 IN

## 2024-05-15 DIAGNOSIS — S46.911D STRAIN OF UNSPECIFIED MUSCLE, FASCIA AND TENDON AT SHOULDER AND UPPER ARM LEVEL, RIGHT ARM, SUBSEQUENT ENCOUNTER: ICD-10-CM

## 2024-05-15 DIAGNOSIS — M75.01 ADHESIVE CAPSULITIS OF RIGHT SHOULDER: ICD-10-CM

## 2024-05-15 PROCEDURE — 99213 OFFICE O/P EST LOW 20 MIN: CPT

## 2024-05-15 NOTE — DISCUSSION/SUMMARY
[de-identified] : cont hep. follow up 3 months.   Progress Note completed by Albina Nguyen PA-C * Dr. Stern -- The documentation recorded in this note accurately reflects the decisions made by me during this visit.

## 2024-05-15 NOTE — ASSESSMENT
[FreeTextEntry1] : s/p right shoulder eua, tank and scope for capsular release, proximal biceps tenotomy, labral gladis, sad, bursectomy and ac joint resection on 7/10/23.   improving but some tightness at times. Is "90%" better- has pain with certain movements and reaching   right elbow pain and swelling on 7/17/23. no injury. no fevers or chills. likely aseptic olecranon bursitis from use of sling. resolved.  also tingling in hand. some neck issues in past but never tingling in hand. possible hnp. see spine docs in [place] for a while ([facility]). improved.   at [facility] - no light duty available at work.  working full time, full duty  htn, migraines.

## 2024-05-15 NOTE — PHYSICAL EXAM
[5 ___] : forward flexion 5[unfilled]/5 [Right] : right elbow [5___] : supination 5[unfilled]/5 [de-identified] : active abduction 145 degrees [] : no swelling [FreeTextEntry3] : no signs of infection noted.  [TWNoteComboBox7] : flexion 130 degrees [TWNoteComboBox4] : extension <0 degrees [TWNoteComboBox6] : pronation 80 degrees [de-identified] : supination 90 degrees

## 2024-05-15 NOTE — HISTORY OF PRESENT ILLNESS
[Work related] : work related [Intermittent] : intermittent [Rest] : rest [1] : 2 [0] : 0 [Dull/Aching] : dull/aching [Full time] : Work status: full time [de-identified] : WC DOI 1/11/23 7/19/23:  s/p right shoulder scope since 7/10/23.  now right elbow swelling since 7/17/23.  no injury.  no fevers or chills.  8/2/23: right elbow and right shoulder improved. 8/30/23: improved but still some pain and stiffness. 10/4/23:  improving but still tightness.  pain much less.  11/15/23:  follow up right shoulder.  improving with but mild discomfort with reaching and still some tightness.  12/13/23:  follow up right shoulder.  still some tightness but better.  1/17/24:  follow up right shoulder.  improving.  3/20/24: f/up R shoulder. has been improving. No longer in PT, doing HEP. Working full time, full duty 5/15/24: f/up right shoulder. notes improvement, "90%" better  [] : no [FreeTextEntry1] : rt shoulder [FreeTextEntry3] : 1/11/2023 [de-identified] : Dr. Stern [de-identified] : 6/6/2023 [de-identified] : [facility]

## 2024-08-21 ENCOUNTER — APPOINTMENT (OUTPATIENT)
Dept: ORTHOPEDIC SURGERY | Facility: CLINIC | Age: 59
End: 2024-08-21
Payer: OTHER MISCELLANEOUS

## 2024-08-21 VITALS — WEIGHT: 220 LBS | BODY MASS INDEX: 29.8 KG/M2 | HEIGHT: 72 IN

## 2024-08-21 DIAGNOSIS — M75.01 ADHESIVE CAPSULITIS OF RIGHT SHOULDER: ICD-10-CM

## 2024-08-21 DIAGNOSIS — M70.21 OLECRANON BURSITIS, RIGHT ELBOW: ICD-10-CM

## 2024-08-21 PROCEDURE — 99243 OFF/OP CNSLTJ NEW/EST LOW 30: CPT

## 2024-08-21 NOTE — ASSESSMENT
[FreeTextEntry1] : s/p right shoulder eua, tank and scope for capsular release, proximal biceps tenotomy, labral gladis, sad, bursectomy and ac joint resection on 7/10/23.   improved but some tightness at times. some loss of motion and pain with use.  slu as above.  right elbow pain and swelling on 7/17/23. no injury. no fevers or chills. likely aseptic olecranon bursitis from use of sling. resolved.  also tingling in hand. some neck issues in past but never tingling in hand. possible hnp. see spine docs in [place] for a while ([facility]). improved.   at [employer] - no light duty available at work.  working full time, full duty  htn, migraines.

## 2024-08-21 NOTE — PHYSICAL EXAM
[5 ___] : forward flexion 5[unfilled]/5 [Right] : right elbow [5___] : supination 5[unfilled]/5 [de-identified] : active abduction 150 degrees [] : no swelling [FreeTextEntry3] : no signs of infection noted.  [TWNoteComboBox7] : flexion 130 degrees [TWNoteComboBox4] : extension 0 degrees [TWNoteComboBox6] : pronation 90 degrees [de-identified] : supination 90 degrees

## 2024-08-21 NOTE — HISTORY OF PRESENT ILLNESS
[Work related] : work related [1] : 2 [0] : 0 [Dull/Aching] : dull/aching [Intermittent] : intermittent [Rest] : rest [Full time] : Work status: full time [de-identified] : WC DOI 1/11/23 7/19/23:  s/p right shoulder scope since 7/10/23.  now right elbow swelling since 7/17/23.  no injury.  no fevers or chills.  8/2/23: right elbow and right shoulder improved. 8/30/23: improved but still some pain and stiffness. 10/4/23:  improving but still tightness.  pain much less.  11/15/23:  follow up right shoulder.  improving with but mild discomfort with reaching and still some tightness.  12/13/23:  follow up right shoulder.  still some tightness but better.  1/17/24:  follow up right shoulder.  improving.  3/20/24: f/up R shoulder. has been improving. No longer in PT, doing HEP. Working full time, full duty 5/15/24: f/up right shoulder. notes improvement, "90%" better  8/21/24:   shoulder stable with some stiffness.  elbow pain resolved. [] : no [FreeTextEntry1] : rt shoulder [FreeTextEntry3] : 1/11/2023 [de-identified] : heavy extertion  [de-identified] : Dr. Stern [de-identified] : 6/6/2023 [de-identified] : [facility]

## 2024-08-21 NOTE — WORK
[Can return to work with limitations on ______] : can return to work with limitations on [unfilled] [Has the patient reached Maximum Medical Improvement? If yes, indicate date___] : Yes, on [unfilled] [Is there permanent partial impairment?] : Yes [Right] : right [FreeTextEntry7] : shoulder [FreeTextEntry8] : as above. [FreeTextEntry5] : 10 [de-identified] : mild loss of ir and er.  some loss of forward flexion and proximal biceps injury [de-identified] : elbow [de-identified] : 0 [At the pre-injury job] : At the pre-injury job [___lbs] : Occasionally: [unfilled] lbs [] : Occasionally [Could this patient perform his/her at-injury work activities with restrictions? If yes, specify below.] : Yes [Could this patient perform any work activities with or without restrictions? Explain below.] : Yes [Has the patient had an injury/illness since the date of injury which impacts residual functional capacity?] : No [Would the patient benefit from vocational rehabilitation? If Yes, explain below.] :  No [de-identified] : as above [de-identified] : as above

## 2024-08-21 NOTE — DISCUSSION/SUMMARY
[de-identified] : slu as above. The ranges of motion of each joint noted above was taken 3 times and a goniometer was used where appropriate.  The maximal range of motion is noted in the record.

## 2024-09-09 ENCOUNTER — APPOINTMENT (OUTPATIENT)
Dept: ORTHOPEDIC SURGERY | Facility: CLINIC | Age: 59
End: 2024-09-09

## 2025-09-05 ENCOUNTER — APPOINTMENT (OUTPATIENT)
Dept: ORTHOPEDIC SURGERY | Facility: CLINIC | Age: 60
End: 2025-09-05